# Patient Record
Sex: FEMALE | Race: WHITE | NOT HISPANIC OR LATINO | Employment: OTHER | URBAN - METROPOLITAN AREA
[De-identification: names, ages, dates, MRNs, and addresses within clinical notes are randomized per-mention and may not be internally consistent; named-entity substitution may affect disease eponyms.]

---

## 2018-07-05 ENCOUNTER — HISTORICAL (OUTPATIENT)
Dept: RADIOLOGY | Facility: HOSPITAL | Age: 78
End: 2018-07-05

## 2020-09-02 ENCOUNTER — HISTORICAL (OUTPATIENT)
Dept: RADIOLOGY | Facility: HOSPITAL | Age: 80
End: 2020-09-02

## 2023-07-08 ENCOUNTER — HOSPITAL ENCOUNTER (OUTPATIENT)
Facility: HOSPITAL | Age: 83
Discharge: HOME | End: 2023-07-11
Attending: EMERGENCY MEDICINE | Admitting: INTERNAL MEDICINE
Payer: MEDICARE

## 2023-07-08 DIAGNOSIS — R06.02 SHORTNESS OF BREATH: ICD-10-CM

## 2023-07-08 DIAGNOSIS — I50.21 ACUTE SYSTOLIC CHF (CONGESTIVE HEART FAILURE): Primary | ICD-10-CM

## 2023-07-08 DIAGNOSIS — I16.0 HYPERTENSIVE URGENCY: ICD-10-CM

## 2023-07-08 DIAGNOSIS — R06.02 SOB (SHORTNESS OF BREATH): ICD-10-CM

## 2023-07-08 LAB
ALBUMIN SERPL-MCNC: 3.9 G/DL (ref 3.4–4.8)
ALBUMIN/GLOB SERPL: 1.2 RATIO (ref 1.1–2)
ALP SERPL-CCNC: 56 UNIT/L (ref 40–150)
ALT SERPL-CCNC: 11 UNIT/L (ref 0–55)
AST SERPL-CCNC: 12 UNIT/L (ref 5–34)
BASOPHILS # BLD AUTO: 0.06 X10(3)/MCL
BASOPHILS NFR BLD AUTO: 0.5 %
BILIRUBIN DIRECT+TOT PNL SERPL-MCNC: 0.4 MG/DL
BNP BLD-MCNC: 886.8 PG/ML
BUN SERPL-MCNC: 20.4 MG/DL (ref 9.8–20.1)
CALCIUM SERPL-MCNC: 10.2 MG/DL (ref 8.4–10.2)
CHLORIDE SERPL-SCNC: 105 MMOL/L (ref 98–107)
CO2 SERPL-SCNC: 25 MMOL/L (ref 23–31)
CREAT SERPL-MCNC: 1.23 MG/DL (ref 0.55–1.02)
EOSINOPHIL # BLD AUTO: 0.13 X10(3)/MCL (ref 0–0.9)
EOSINOPHIL NFR BLD AUTO: 1.1 %
ERYTHROCYTE [DISTWIDTH] IN BLOOD BY AUTOMATED COUNT: 13.4 % (ref 11.5–17)
GFR SERPLBLD CREATININE-BSD FMLA CKD-EPI: 44 MLS/MIN/1.73/M2
GLOBULIN SER-MCNC: 3.3 GM/DL (ref 2.4–3.5)
GLUCOSE SERPL-MCNC: 145 MG/DL (ref 82–115)
HCT VFR BLD AUTO: 36.6 % (ref 37–47)
HGB BLD-MCNC: 11.6 G/DL (ref 12–16)
IMM GRANULOCYTES # BLD AUTO: 0.06 X10(3)/MCL (ref 0–0.04)
IMM GRANULOCYTES NFR BLD AUTO: 0.5 %
INR BLD: 1.1 (ref 0–1.3)
LYMPHOCYTES # BLD AUTO: 1.49 X10(3)/MCL (ref 0.6–4.6)
LYMPHOCYTES NFR BLD AUTO: 13 %
MCH RBC QN AUTO: 27.6 PG (ref 27–31)
MCHC RBC AUTO-ENTMCNC: 31.7 G/DL (ref 33–36)
MCV RBC AUTO: 87.1 FL (ref 80–94)
MONOCYTES # BLD AUTO: 0.89 X10(3)/MCL (ref 0.1–1.3)
MONOCYTES NFR BLD AUTO: 7.8 %
NEUTROPHILS # BLD AUTO: 8.81 X10(3)/MCL (ref 2.1–9.2)
NEUTROPHILS NFR BLD AUTO: 77.1 %
NRBC BLD AUTO-RTO: 0 %
PLATELET # BLD AUTO: 260 X10(3)/MCL (ref 130–400)
PMV BLD AUTO: 11.4 FL (ref 7.4–10.4)
POTASSIUM SERPL-SCNC: 4.8 MMOL/L (ref 3.5–5.1)
PROT SERPL-MCNC: 7.2 GM/DL (ref 5.8–7.6)
PROTHROMBIN TIME: 14.1 SECONDS (ref 12.5–14.5)
RBC # BLD AUTO: 4.2 X10(6)/MCL (ref 4.2–5.4)
SODIUM SERPL-SCNC: 142 MMOL/L (ref 136–145)
TROPONIN I SERPL-MCNC: 0.02 NG/ML (ref 0–0.04)
TROPONIN I SERPL-MCNC: 0.03 NG/ML (ref 0–0.04)
TROPONIN I SERPL-MCNC: <0.01 NG/ML (ref 0–0.04)
WBC # SPEC AUTO: 11.44 X10(3)/MCL (ref 4.5–11.5)

## 2023-07-08 PROCEDURE — G0378 HOSPITAL OBSERVATION PER HR: HCPCS

## 2023-07-08 PROCEDURE — 96376 TX/PRO/DX INJ SAME DRUG ADON: CPT

## 2023-07-08 PROCEDURE — 84484 ASSAY OF TROPONIN QUANT: CPT | Performed by: STUDENT IN AN ORGANIZED HEALTH CARE EDUCATION/TRAINING PROGRAM

## 2023-07-08 PROCEDURE — 63600175 PHARM REV CODE 636 W HCPCS: Performed by: EMERGENCY MEDICINE

## 2023-07-08 PROCEDURE — 80053 COMPREHEN METABOLIC PANEL: CPT | Performed by: STUDENT IN AN ORGANIZED HEALTH CARE EDUCATION/TRAINING PROGRAM

## 2023-07-08 PROCEDURE — 85610 PROTHROMBIN TIME: CPT | Performed by: STUDENT IN AN ORGANIZED HEALTH CARE EDUCATION/TRAINING PROGRAM

## 2023-07-08 PROCEDURE — 93010 ELECTROCARDIOGRAM REPORT: CPT | Mod: ,,, | Performed by: INTERNAL MEDICINE

## 2023-07-08 PROCEDURE — 25000003 PHARM REV CODE 250: Performed by: EMERGENCY MEDICINE

## 2023-07-08 PROCEDURE — 96374 THER/PROPH/DIAG INJ IV PUSH: CPT

## 2023-07-08 PROCEDURE — 93005 ELECTROCARDIOGRAM TRACING: CPT

## 2023-07-08 PROCEDURE — 99285 EMERGENCY DEPT VISIT HI MDM: CPT | Mod: 25

## 2023-07-08 PROCEDURE — 63600175 PHARM REV CODE 636 W HCPCS

## 2023-07-08 PROCEDURE — 25000003 PHARM REV CODE 250

## 2023-07-08 PROCEDURE — 93010 EKG 12-LEAD: ICD-10-PCS | Mod: ,,, | Performed by: INTERNAL MEDICINE

## 2023-07-08 PROCEDURE — 85025 COMPLETE CBC W/AUTO DIFF WBC: CPT | Performed by: STUDENT IN AN ORGANIZED HEALTH CARE EDUCATION/TRAINING PROGRAM

## 2023-07-08 PROCEDURE — 83880 ASSAY OF NATRIURETIC PEPTIDE: CPT | Performed by: STUDENT IN AN ORGANIZED HEALTH CARE EDUCATION/TRAINING PROGRAM

## 2023-07-08 PROCEDURE — 84484 ASSAY OF TROPONIN QUANT: CPT | Mod: 91 | Performed by: EMERGENCY MEDICINE

## 2023-07-08 RX ORDER — HYDRALAZINE HYDROCHLORIDE 20 MG/ML
10 INJECTION INTRAMUSCULAR; INTRAVENOUS
Status: DISCONTINUED | OUTPATIENT
Start: 2023-07-08 | End: 2023-07-08

## 2023-07-08 RX ORDER — POLYETHYLENE GLYCOL 3350 17 G/17G
17 POWDER, FOR SOLUTION ORAL DAILY
Status: DISCONTINUED | OUTPATIENT
Start: 2023-07-08 | End: 2023-07-11 | Stop reason: HOSPADM

## 2023-07-08 RX ORDER — FAMOTIDINE 20 MG/1
20 TABLET, FILM COATED ORAL DAILY
Status: DISCONTINUED | OUTPATIENT
Start: 2023-07-08 | End: 2023-07-11 | Stop reason: HOSPADM

## 2023-07-08 RX ORDER — SODIUM CHLORIDE 0.9 % (FLUSH) 0.9 %
10 SYRINGE (ML) INJECTION
Status: DISCONTINUED | OUTPATIENT
Start: 2023-07-08 | End: 2023-07-11 | Stop reason: HOSPADM

## 2023-07-08 RX ORDER — METFORMIN HYDROCHLORIDE 500 MG/1
500 TABLET ORAL 2 TIMES DAILY WITH MEALS
COMMUNITY

## 2023-07-08 RX ORDER — LOSARTAN POTASSIUM 50 MG/1
100 TABLET ORAL DAILY
Status: DISCONTINUED | OUTPATIENT
Start: 2023-07-08 | End: 2023-07-10

## 2023-07-08 RX ORDER — FUROSEMIDE 10 MG/ML
40 INJECTION INTRAMUSCULAR; INTRAVENOUS
Status: COMPLETED | OUTPATIENT
Start: 2023-07-08 | End: 2023-07-08

## 2023-07-08 RX ORDER — HYDROCODONE BITARTRATE AND ACETAMINOPHEN 5; 325 MG/1; MG/1
1 TABLET ORAL EVERY 4 HOURS PRN
Status: DISCONTINUED | OUTPATIENT
Start: 2023-07-08 | End: 2023-07-11 | Stop reason: HOSPADM

## 2023-07-08 RX ORDER — ATORVASTATIN CALCIUM 10 MG/1
20 TABLET, FILM COATED ORAL NIGHTLY
Status: DISCONTINUED | OUTPATIENT
Start: 2023-07-08 | End: 2023-07-11 | Stop reason: HOSPADM

## 2023-07-08 RX ORDER — ACETAMINOPHEN 325 MG/1
650 TABLET ORAL EVERY 8 HOURS PRN
Status: DISCONTINUED | OUTPATIENT
Start: 2023-07-08 | End: 2023-07-11 | Stop reason: HOSPADM

## 2023-07-08 RX ORDER — SOTALOL HYDROCHLORIDE 80 MG/1
80 TABLET ORAL 2 TIMES DAILY
Status: DISCONTINUED | OUTPATIENT
Start: 2023-07-08 | End: 2023-07-11 | Stop reason: HOSPADM

## 2023-07-08 RX ORDER — ASPIRIN 325 MG
325 TABLET, DELAYED RELEASE (ENTERIC COATED) ORAL
Status: COMPLETED | OUTPATIENT
Start: 2023-07-08 | End: 2023-07-08

## 2023-07-08 RX ORDER — GLIPIZIDE 2.5 MG/1
5 TABLET, EXTENDED RELEASE ORAL
COMMUNITY

## 2023-07-08 RX ORDER — FUROSEMIDE 10 MG/ML
20 INJECTION INTRAMUSCULAR; INTRAVENOUS ONCE
Status: COMPLETED | OUTPATIENT
Start: 2023-07-08 | End: 2023-07-08

## 2023-07-08 RX ORDER — ATORVASTATIN CALCIUM 20 MG/1
20 TABLET, FILM COATED ORAL NIGHTLY
COMMUNITY

## 2023-07-08 RX ORDER — ONDANSETRON 2 MG/ML
4 INJECTION INTRAMUSCULAR; INTRAVENOUS EVERY 8 HOURS PRN
Status: DISCONTINUED | OUTPATIENT
Start: 2023-07-08 | End: 2023-07-11 | Stop reason: HOSPADM

## 2023-07-08 RX ORDER — TALC
6 POWDER (GRAM) TOPICAL NIGHTLY PRN
Status: DISCONTINUED | OUTPATIENT
Start: 2023-07-08 | End: 2023-07-11 | Stop reason: HOSPADM

## 2023-07-08 RX ORDER — LOSARTAN POTASSIUM 100 MG/1
100 TABLET ORAL DAILY
Status: ON HOLD | COMMUNITY
End: 2023-07-11 | Stop reason: HOSPADM

## 2023-07-08 RX ADMIN — Medication 6 MG: at 08:07

## 2023-07-08 RX ADMIN — ATORVASTATIN CALCIUM 20 MG: 10 TABLET, FILM COATED ORAL at 08:07

## 2023-07-08 RX ADMIN — FUROSEMIDE 40 MG: 10 INJECTION, SOLUTION INTRAMUSCULAR; INTRAVENOUS at 05:07

## 2023-07-08 RX ADMIN — NITROGLYCERIN 1 INCH: 20 OINTMENT TOPICAL at 03:07

## 2023-07-08 RX ADMIN — SOTALOL HYDROCHLORIDE 80 MG: 80 TABLET ORAL at 08:07

## 2023-07-08 RX ADMIN — ASPIRIN 325 MG: 325 TABLET, COATED ORAL at 03:07

## 2023-07-08 RX ADMIN — FAMOTIDINE 20 MG: 20 TABLET, FILM COATED ORAL at 09:07

## 2023-07-08 RX ADMIN — APIXABAN 5 MG: 5 TABLET, FILM COATED ORAL at 08:07

## 2023-07-08 RX ADMIN — POLYETHYLENE GLYCOL 3350 17 G: 17 POWDER, FOR SOLUTION ORAL at 09:07

## 2023-07-08 RX ADMIN — FUROSEMIDE 20 MG: 10 INJECTION, SOLUTION INTRAMUSCULAR; INTRAVENOUS at 08:07

## 2023-07-08 NOTE — Clinical Note
Diagnosis: Shortness of breath [786.05.ICD-9-CM]   Future Attending Provider: KEN PADILLA [[id number]]   Admitting Provider:: KEN PADILLA [[id number]]

## 2023-07-08 NOTE — H&P
[facility] - Emergency Dept    Cardiology  History and Physical     Patient Name: Niharika Campbell  MRN: [id number]  Admission Date: 7/8/2023  Code Status: Full Code   Attending Provider: Ritchie Dowd MD   Primary Care Physician: No primary care provider on file.  Principal Problem:<principal problem not specified>    Patient information was obtained from patient and ER records.     Subjective:     Chief Complaint: CHF    HPI: This is a 83 year old female known to [facility], Dr. Toro, with a PMH of PAF (on Eliquis), Bio MVR (2012), HTN, HLD, and DM.  She presented to [facility] ED with complaints of SOB for two consecutive nights.  Patient denied any exertional dyspnea with ambulation, peripheral edema, chest pain or palpitations. Chest x- ray - revealed cardiomegaly.  BP on admission 190/91.  BNP- 886.8, tropnin- negative,  Patient was noted to have a recent in office ECHO on 5.30.23 that revealed a mildly reduced systolic function with EF of 45% and grade II diastolic dysfunction. Mild to moderate regurgitation of prosthetic noted (see report below). The patient is admitted for CHF     PMH: PAF (on Eliquis), Bio MVR (2012), HTN, HLD, and DM  PSH: Bio MVR (2012), LHC, colon surgery, hysterectomy, cholecystectomy  Family History: Mother- DM   Social History: Never smoked     Previous Cardiac Diagnostics:   ECHO 5.30.23  1. The study quality is average.   2. The left ventricle is normal in size. Global left ventricular systolic function is mildly decreased. The left ventricular ejection fraction is 45%. GLS is -8.5%.  The left ventricle diastolic function is impaired (Grade II) with an elevated left atrial pressure.Moderate to severe asymmetric septal left ventricular hypertrophy is present.   3. Right ventricular systolic function is borderline normal. TAPSE ~ 1.3 cm. RVTD ~ 9 cm/s.  4. The left atrial diameter is mildly increased ~4.1 cm; DRE ~70.3ml/m².  5. The bio prosthetic mitral valve is a porcine  valve. Regurgitation of the prosthetic valve is noted. The area by pressure half time is 1.5 cm². The mean trans mitral gradient is 4.0 mmHg. Mild to moderate (1-2+) regurgitation is noted.  6. Mild to moderate aortic valve stenosis is present. The aortic valve area planimetry is 0.6 cm². Aortic valve area continuity equation is 0.9 cm². PSV ~2.5 m/s. MG ~13.2 mmHg. DI ~0.30. Moderate calcification of the aortic valve is noted.  7. Mild to moderate (1-2+) aortic and pulmonic, mild (1+) tricuspid regurgitation.   8. The pulmonary artery systolic pressure is 13 mmHg.     ECHO 9.20.20  1. The study quality is average.   2. The left ventricle is normal in size. Global left ventricular systolic function is normal. The left ventricular ejection fraction is 60%. Left ventricular diastolic function is indeterminate. Noted left ventricular hypertrophy. Concentric left ventricular hypertrophy is present. It is mild to moderate.  3. The left atrial diameter is mildly increased.  4. The right atrium is mildly enlarged.   5. Aortic cusp separation measures 1.3 cms . Noted evidence of aortic stenosis. Mild aortic valve stenosis is present. Aortic valve area continuity equation is 1.5 cm². Noted evidence of aortic regurgitation. Mild to moderate (1-2+) aortic regurgitation. The trans-aortic peak velocity is 2.4 m/s. The trans-aortic mean gradient is 9.7 mmHg.  6. A bio prosthetic valve is noted at the mitral location. The bio prosthetic valve is a porcine valve. Regurgitation of the prosthetic valve is noted. MV MPG 6.3 mmHg. Mitral regurgitation is noted. Mild to moderate (1-2+) mitral regurgitation.  7. Evidence of pulmonic regurgitation is noted. Mild (1+) pulmonic regurgitation.  8. Evidence of tricuspid regurgitation is present. Mild (1+) tricuspid regurgitation.  9. The pulmonary artery systolic pressure is 37 mmHg. The pulmonary artery appears to be normal.       [facility] 4.18.12   Normal coronary arteries    History reviewed.  No pertinent past medical history.    History reviewed. No pertinent surgical history.    Review of patient's allergies indicates:  No Known Allergies    No current facility-administered medications on file prior to encounter.     Current Outpatient Medications on File Prior to Encounter   Medication Sig    apixaban (ELIQUIS) 5 mg Tab Take 5 mg by mouth 2 (two) times daily.    atorvastatin (LIPITOR) 20 MG tablet Take 20 mg by mouth nightly.    glipiZIDE (GLUCOTROL) 2.5 MG TR24 Take 5 mg by mouth before breakfast.    losartan (COZAAR) 100 MG tablet Take 100 mg by mouth once daily.    metFORMIN (GLUCOPHAGE) 500 MG tablet Take 500 mg by mouth 2 (two) times daily with meals.    sotalol HCl (SOTALOL ORAL) Take 80 mg by mouth 2 (two) times a day.     Family History    None       Tobacco Use    Smoking status: Not on file    Smokeless tobacco: Not on file   Substance and Sexual Activity    Alcohol use: Not on file    Drug use: Not on file    Sexual activity: Not on file     Review of Systems   Respiratory:  Positive for shortness of breath.    Objective:     Vital Signs (Most Recent):  Temp: 98.2 °F (36.8 °C) (07/08/23 0333)  Pulse: 60 (07/08/23 0703)  Resp: (!) 23 (07/08/23 0703)  BP: (!) 165/73 (07/08/23 0703)  SpO2: (!) 94 % (07/08/23 0703) Vital Signs (24h Range):  Temp:  [98.2 °F (36.8 °C)] 98.2 °F (36.8 °C)  Pulse:  [60-94] 60  Resp:  [19-26] 23  SpO2:  [91 %-97 %] 94 %  BP: (148-198)/(73-99) 165/73     Weight: 77.1 kg (170 lb)  Body mass index is 32.12 kg/m².    SpO2: (!) 94 %         Intake/Output Summary (Last 24 hours) at 7/8/2023 0920  Last data filed at 7/8/2023 0658  Gross per 24 hour   Intake --   Output 800 ml   Net -800 ml       Lines/Drains/Airways       Peripheral Intravenous Line  Duration                  Peripheral IV - Single Lumen 07/08/23 0420 20 G Left Forearm <1 day                    Physical Exam  Cardiovascular:      Rate and Rhythm: Normal rate. Rhythm irregular.   Musculoskeletal:          General: Normal range of motion.   Neurological:      General: No focal deficit present.      Mental Status: She is alert.       EKG:     Telemetry:     Significant Labs: CMP   Recent Labs   Lab 07/08/23  0353      K 4.8   CO2 25   BUN 20.4*   CREATININE 1.23*   CALCIUM 10.2   ALBUMIN 3.9   BILITOT 0.4   ALKPHOS 56   AST 12   ALT 11   , CBC   Recent Labs   Lab 07/08/23 0353   WBC 11.44   HGB 11.6*   HCT 36.6*      , INR   Recent Labs   Lab 07/08/23 0353   INR 1.10   PROTIME 14.1   , and   Recent Lab Results         07/08/23 0353        Albumin/Globulin Ratio 1.2       Albumin 3.9       Alkaline Phosphatase 56       ALT 11       AST 12       Baso # 0.06       Basophil % 0.5       BILIRUBIN TOTAL 0.4       .8       BUN 20.4       Calcium 10.2       Chloride 105       CO2 25       Creatinine 1.23       eGFR 44       Eos # 0.13       Eosinophil % 1.1       Globulin, Total 3.3       Glucose 145       Hematocrit 36.6       Hemoglobin 11.6       Immature Grans (Abs) 0.06       Immature Granulocytes 0.5       INR 1.10       Lymph # 1.49       LYMPH % 13.0       MCH 27.6       MCHC 31.7       MCV 87.1       Mono # 0.89       Mono % 7.8       MPV 11.4       Neut # 8.81       Neut % 77.1       nRBC 0.0       Platelets 260       Potassium 4.8       PROTEIN TOTAL 7.2       Protime 14.1       RBC 4.20       RDW 13.4       Sodium 142       Troponin I <0.010       WBC 11.44               Significant Imaging:   Assessment and Plan:   Acute HFrEF  - LVEF -45% per ECHO 5.30.23 decreased from previous EF-60% per ECHO 9.20.20  - PAF (on Eliquis),   Bio MVR (2012)  HTN  HLD  DM      Plan   Continue Home losartan. HCTZ on hold  Resume home sotalol 80 mg BID, statin  Resume home Eliquis for primary prevention of CVA  Strict Intake and Output/Daily Weights      VTE Risk Mitigation (From admission, onward)           Ordered     IP VTE HIGH RISK PATIENT  Once         07/08/23 0506     Place sequential compression device   Until discontinued         07/08/23 0506                    MARYJANE Payton  Cardiology  [facility] - Emergency Dept  07/08/2023 9:20 AM

## 2023-07-08 NOTE — ED PROVIDER NOTES
"Encounter Date: 7/8/2023    SCRIBE #1 NOTE: I, Dedejames Ackerman, am scribing for, and in the presence of,  Darlin Martin MD. I have scribed the following portions of the note - Other sections scribed: HPI, ROS, PE.     History     Chief Complaint   Patient presents with    Shortness of Breath     Pt reports SOB starting @ 2300 last night. Pt reports this is the second night the SOB has occurred. Pt claims it is because she has nasal congestion and is "stopped up". Hx of valve replacement and a-fib, followed by . O2 sat 92% on RA, respirations even and labored.      83 year old female with pmhx of Afib, DM, CHF, HTN, and HLD presents to the ED c/o SOB onset last night. She reports waking up with SOB for two consecutive nights accompanied by a congested nose, which she notes does not occur during the day. Pt reports that sitting up from a flat position did not improve SOB. She states that she does not experience SOB when ambulating and is not currently experiencing SOB. She reports a slight cough and denies fever, chest pain, leg swelling, weight gain, abdominal pain, or palpitations. She denies past history of smoking and is not currently taking aspirin. She is currently on the blood thinner Eliquis. She states she has had a valve replacement eleven years ago and has not had valve issues in the past, reports normal blood pressure at home. She states that she has had past angiogram and stress test. She states that she is seeing Dr. Brannon Toro this Monday for a follow up regarding EKG and valve ultrasound results.    Pt's PCP is Dr. Damion Tam.  Pt's cardiologist is Dr. Brannon Toro.    The history is provided by the patient. No  was used.   Review of patient's allergies indicates:  No Known Allergies  History reviewed. No pertinent past medical history.  History reviewed. No pertinent surgical history.  History reviewed. No pertinent family history.     Review of Systems   Constitutional:  " Negative for chills, diaphoresis and fever.   HENT:  Positive for rhinorrhea. Negative for congestion, ear pain, sinus pain and sore throat.    Eyes:  Negative for pain, discharge and visual disturbance.   Respiratory:  Positive for cough and shortness of breath. Negative for wheezing and stridor.    Cardiovascular:  Negative for chest pain, palpitations and leg swelling.   Gastrointestinal:  Negative for abdominal pain, constipation, diarrhea, nausea, rectal pain and vomiting.   Genitourinary:  Negative for dysuria and hematuria.   Musculoskeletal:  Negative for back pain and myalgias.   Skin:  Negative for rash.   Neurological:  Negative for dizziness, syncope, numbness and headaches.   Hematological: Negative.    Psychiatric/Behavioral: Negative.     All other systems reviewed and are negative.    Physical Exam     Initial Vitals [07/08/23 0333]   BP Pulse Resp Temp SpO2   (!) 190/91 77 (!) 22 98.2 °F (36.8 °C) (!) 92 %      MAP       --         Physical Exam    Nursing note and vitals reviewed.  Constitutional: She appears well-developed and well-nourished. She is not diaphoretic. No distress.   HENT:   Head: Normocephalic and atraumatic.   Nose: Nose normal.   Mouth/Throat: Oropharynx is clear and moist.   Eyes: Conjunctivae and EOM are normal. Pupils are equal, round, and reactive to light.   Neck: Trachea normal. Neck supple.   Normal range of motion.  Cardiovascular:  Normal heart sounds and intact distal pulses.           No murmur heard.  Pulses:       Radial pulses are 2+ on the right side and 2+ on the left side.        Dorsalis pedis pulses are 2+ on the right side and 2+ on the left side.   Irregular rate   Pulmonary/Chest: No respiratory distress. She has no wheezes. She has no rhonchi. She has rales. She exhibits no tenderness.   bilateral crackles   Abdominal: Abdomen is soft. Bowel sounds are normal. She exhibits no distension and no mass. no abdominal tenderness There is no rebound and no  guarding.   Musculoskeletal:         General: Edema present. No tenderness. Normal range of motion.      Cervical back: Normal range of motion and neck supple.      Lumbar back: Normal. Normal range of motion.      Comments: Trace pre pedal edema     Neurological: She is alert and oriented to person, place, and time. She has normal strength. No cranial nerve deficit or sensory deficit.   Skin: Skin is warm and dry. Capillary refill takes less than 2 seconds. No abscess noted. No erythema. No pallor.   Psychiatric: She has a normal mood and affect. Her behavior is normal. Judgment and thought content normal.       ED Course   Procedures  Labs Reviewed   COMPREHENSIVE METABOLIC PANEL - Abnormal; Notable for the following components:       Result Value    Glucose Level 145 (*)     Blood Urea Nitrogen 20.4 (*)     Creatinine 1.23 (*)     All other components within normal limits   B-TYPE NATRIURETIC PEPTIDE - Abnormal; Notable for the following components:    Natriuretic Peptide 886.8 (*)     All other components within normal limits   CBC WITH DIFFERENTIAL - Abnormal; Notable for the following components:    Hgb 11.6 (*)     Hct 36.6 (*)     MCHC 31.7 (*)     MPV 11.4 (*)     IG# 0.06 (*)     All other components within normal limits   TROPONIN I - Normal   PROTIME-INR - Normal   CBC W/ AUTO DIFFERENTIAL    Narrative:     The following orders were created for panel order CBC auto differential.  Procedure                               Abnormality         Status                     ---------                               -----------         ------                     CBC with Differential[[id number]]        Abnormal            Final result                 Please view results for these tests on the individual orders.          Imaging Results              X-Ray Chest AP Portable (Preliminary result)  Result time 07/08/23 03:50:59      Wet Read by Darlin Martin MD (07/08/23 03:50:59, [facility]  Emergency [facility], Emergency Medicine)    cardiomegaly                                     Medications   sodium chloride 0.9% flush 10 mL (has no administration in time range)   melatonin tablet 6 mg (has no administration in time range)   acetaminophen tablet 650 mg (has no administration in time range)   HYDROcodone-acetaminophen 5-325 mg per tablet 1 tablet (has no administration in time range)   polyethylene glycol packet 17 g (has no administration in time range)   famotidine tablet 20 mg (has no administration in time range)   ondansetron injection 4 mg (has no administration in time range)   losartan tablet 100 mg (has no administration in time range)   aspirin EC tablet 325 mg (325 mg Oral Given 7/8/23 0356)   nitroGLYCERIN 2% TD oint ointment 1 inch (1 inch Topical (Top) Given 7/8/23 0356)   furosemide injection 40 mg (40 mg Intravenous Given 7/8/23 0502)     Medical Decision Making:   History:   Old Medical Records: I decided to obtain old medical records.  Old Records Summarized: records from previous admission(s).       <> Summary of Records: Previous visit for palpitations  Initial Assessment:   See hpi  Independently Interpreted Test(s):   I have ordered and independently interpreted X-rays - see prior notes.  I have ordered and independently interpreted EKG Reading(s) - see prior notes  Clinical Tests:   Lab Tests: Ordered and Reviewed  The following lab test(s) were unremarkable: CBC, CMP and Troponin  Radiological Study: Ordered and Reviewed  Medical Tests: Reviewed and Ordered  Other:   I have discussed this case with another health care provider.        Scribe Attestation:   Scribe #1: I performed the above scribed service and the documentation accurately describes the services I performed. I attest to the accuracy of the note.  Comments: Attending:   Physician Attestation Statement for Scribe #1: I, Darlin Martin MD, personally performed the services described in this documentation. All medical  record entries made by the scribe were at my direction and in my presence.  I have reviewed the chart and agree that the record reflects my personal performance and is accurate and complete.        Attending Attestation:           Physician Attestation for Scribe:  Physician Attestation Statement for Scribe #1: I, Darlin Martin MD, reviewed documentation, as scribed by Dede Ackerman in my presence, and it is both accurate and complete.       Medical Decision Making  Differential diagnosis includes but is not limited to CHF, ACS, renal failure, and anemia.  CBC, CMP, troponin, BNP, EKG and chest x-ray ordered and reviewed  CBC, CMP and troponin are within normal limits BNP elevated at 800 and EKG nonspecific.  Chest x-ray with cardiomegaly and pulmonary vascular congestion  Signs and symptoms are most consistent with PND episodes due to new onset CHF.  Recent echocardiogram showed a depressed EF of 45%.  He was given aspirin and nitroglycerin with some improvement in her blood pressure and Lasix administered.  Discussed with Cardiology who agree with admission and patient was comfortable with this plan    Cardiac monitoring ordered and reviewed rate of 68 sinus rhythm    Amount and/or Complexity of Data Reviewed  Independent Historian: caregiver  External Data Reviewed: notes.  Labs: ordered. Decision-making details documented in ED Course.  Radiology: ordered and independent interpretation performed. Decision-making details documented in ED Course.  ECG/medicine tests: ordered and independent interpretation performed. Decision-making details documented in ED Course.    Risk  OTC drugs.  Prescription drug management.  Decision regarding hospitalization.           ED Course as of 07/08/23 0507   Sat Jul 08, 2023   0350 Ekg performed at 0341 rate 71 sinus rhythm with first degree av block, lvh with qrs widening, poor r wave progrssion [BS]   0448 Paged [facility]. [BG]   0501 /92 prior to hydralazine. Saqib valles with  [facility] reviewed recent echo, ef 45 which is new. Agrees with admit, bp control, diuresis, patient amenable to this as well [BS]      ED Course User Index  [BG] Alec Ramos  [BS] Darlin Martin MD                 Clinical Impression:   Final diagnoses:  [I50.21] Acute systolic CHF (congestive heart failure) (Primary)  [I16.0] Hypertensive urgency  [R06.02] SOB (shortness of breath)        ED Disposition Condition    Observation Stable                Darlin Martin MD  07/08/23 0406

## 2023-07-08 NOTE — NURSING
Nurses Note -- 4 Eyes      7/8/2023   2:28 PM      Skin assessed during: Admit      [x] No Altered Skin Integrity Present    []Prevention Measures Documented      [] Yes- Altered Skin Integrity Present or Discovered   [] LDA Added if Not in Epic (Describe Wound)   [] New Altered Skin Integrity was Present on Admit and Documented in LDA   [] Wound Image Taken    Wound Care Consulted? No    Attending Nurse:  Mikie Cobb RN     Second RN/Staff Member:  Vilma ABDUL RN

## 2023-07-09 LAB
ALBUMIN SERPL-MCNC: 3.6 G/DL (ref 3.4–4.8)
ALBUMIN/GLOB SERPL: 1 RATIO (ref 1.1–2)
ALP SERPL-CCNC: 50 UNIT/L (ref 40–150)
ALT SERPL-CCNC: 8 UNIT/L (ref 0–55)
AST SERPL-CCNC: 13 UNIT/L (ref 5–34)
BILIRUBIN DIRECT+TOT PNL SERPL-MCNC: 0.5 MG/DL
BUN SERPL-MCNC: 25.9 MG/DL (ref 9.8–20.1)
CALCIUM SERPL-MCNC: 9.9 MG/DL (ref 8.4–10.2)
CHLORIDE SERPL-SCNC: 99 MMOL/L (ref 98–107)
CO2 SERPL-SCNC: 27 MMOL/L (ref 23–31)
CREAT SERPL-MCNC: 1.28 MG/DL (ref 0.55–1.02)
GFR SERPLBLD CREATININE-BSD FMLA CKD-EPI: 42 MLS/MIN/1.73/M2
GLOBULIN SER-MCNC: 3.7 GM/DL (ref 2.4–3.5)
GLUCOSE SERPL-MCNC: 143 MG/DL (ref 82–115)
MAGNESIUM SERPL-MCNC: 1.6 MG/DL (ref 1.6–2.6)
POCT GLUCOSE: 102 MG/DL (ref 70–110)
POTASSIUM SERPL-SCNC: 3.9 MMOL/L (ref 3.5–5.1)
PROT SERPL-MCNC: 7.3 GM/DL (ref 5.8–7.6)
SODIUM SERPL-SCNC: 138 MMOL/L (ref 136–145)

## 2023-07-09 PROCEDURE — 25000003 PHARM REV CODE 250

## 2023-07-09 PROCEDURE — 83735 ASSAY OF MAGNESIUM: CPT | Performed by: NURSE PRACTITIONER

## 2023-07-09 PROCEDURE — 80053 COMPREHEN METABOLIC PANEL: CPT | Performed by: NURSE PRACTITIONER

## 2023-07-09 PROCEDURE — 96376 TX/PRO/DX INJ SAME DRUG ADON: CPT

## 2023-07-09 PROCEDURE — G0378 HOSPITAL OBSERVATION PER HR: HCPCS

## 2023-07-09 PROCEDURE — 63600175 PHARM REV CODE 636 W HCPCS: Performed by: NURSE PRACTITIONER

## 2023-07-09 PROCEDURE — 25000003 PHARM REV CODE 250: Performed by: EMERGENCY MEDICINE

## 2023-07-09 RX ORDER — FUROSEMIDE 10 MG/ML
20 INJECTION INTRAMUSCULAR; INTRAVENOUS DAILY
Status: DISCONTINUED | OUTPATIENT
Start: 2023-07-09 | End: 2023-07-10

## 2023-07-09 RX ADMIN — POLYETHYLENE GLYCOL 3350 17 G: 17 POWDER, FOR SOLUTION ORAL at 08:07

## 2023-07-09 RX ADMIN — APIXABAN 5 MG: 5 TABLET, FILM COATED ORAL at 08:07

## 2023-07-09 RX ADMIN — ATORVASTATIN CALCIUM 20 MG: 10 TABLET, FILM COATED ORAL at 08:07

## 2023-07-09 RX ADMIN — FUROSEMIDE 20 MG: 10 INJECTION, SOLUTION INTRAMUSCULAR; INTRAVENOUS at 12:07

## 2023-07-09 RX ADMIN — SOTALOL HYDROCHLORIDE 80 MG: 80 TABLET ORAL at 08:07

## 2023-07-09 RX ADMIN — LOSARTAN POTASSIUM 100 MG: 50 TABLET, FILM COATED ORAL at 08:07

## 2023-07-09 RX ADMIN — Medication 6 MG: at 08:07

## 2023-07-09 RX ADMIN — FAMOTIDINE 20 MG: 20 TABLET, FILM COATED ORAL at 08:07

## 2023-07-09 NOTE — PROGRESS NOTES
[facility] - Emergency Dept    Cardiology  History and Physical     Patient Name: Niharika Campbell  MRN: [id number]  Admission Date: 7/8/2023  Code Status: Full Code   Attending Provider: Ritchie Dowd MD   Primary Care Physician: No primary care provider on file.  Principal Problem:<principal problem not specified>    Patient information was obtained from patient and ER records.     Subjective:     Chief Complaint: CHF    HPI: This is a 83 year old female known to [facility], Dr. Toro, with a PMH of PAF (on Eliquis), Bio MVR (2012), HTN, HLD, and DM.  She presented to [facility] ED with complaints of SOB for two consecutive nights.  Patient denied any exertional dyspnea with ambulation, peripheral edema, chest pain or palpitations. Chest x- ray - revealed cardiomegaly.  BP on admission 190/91.  BNP- 886.8, tropnin- negative,  Patient was noted to have a recent in office ECHO on 5.30.23 that revealed a mildly reduced systolic function with EF of 45% and grade II diastolic dysfunction. Mild to moderate regurgitation of prosthetic noted (see report below). The patient is admitted for CHF     7.9.23: Patient endorses improved SOB. Denies CP.     PMH: PAF (on Eliquis), Bio MVR (2012), HTN, HLD, and DM  PSH: Bio MVR (2012), LHC, colon surgery, hysterectomy, cholecystectomy  Family History: Mother- DM   Social History: Never smoked     Previous Cardiac Diagnostics:   ECHO 5.30.23  1. The study quality is average.   2. The left ventricle is normal in size. Global left ventricular systolic function is mildly decreased. The left ventricular ejection fraction is 45%. GLS is -8.5%.  The left ventricle diastolic function is impaired (Grade II) with an elevated left atrial pressure.Moderate to severe asymmetric septal left ventricular hypertrophy is present.   3. Right ventricular systolic function is borderline normal. TAPSE ~ 1.3 cm. RVTD ~ 9 cm/s.  4. The left atrial diameter is mildly increased ~4.1 cm; DRE  ~70.3ml/m².  5. The bio prosthetic mitral valve is a porcine valve. Regurgitation of the prosthetic valve is noted. The area by pressure half time is 1.5 cm². The mean trans mitral gradient is 4.0 mmHg. Mild to moderate (1-2+) regurgitation is noted.  6. Mild to moderate aortic valve stenosis is present. The aortic valve area planimetry is 0.6 cm². Aortic valve area continuity equation is 0.9 cm². PSV ~2.5 m/s. MG ~13.2 mmHg. DI ~0.30. Moderate calcification of the aortic valve is noted.  7. Mild to moderate (1-2+) aortic and pulmonic, mild (1+) tricuspid regurgitation.   8. The pulmonary artery systolic pressure is 13 mmHg.     ECHO 9.20.20  1. The study quality is average.   2. The left ventricle is normal in size. Global left ventricular systolic function is normal. The left ventricular ejection fraction is 60%. Left ventricular diastolic function is indeterminate. Noted left ventricular hypertrophy. Concentric left ventricular hypertrophy is present. It is mild to moderate.  3. The left atrial diameter is mildly increased.  4. The right atrium is mildly enlarged.   5. Aortic cusp separation measures 1.3 cms . Noted evidence of aortic stenosis. Mild aortic valve stenosis is present. Aortic valve area continuity equation is 1.5 cm². Noted evidence of aortic regurgitation. Mild to moderate (1-2+) aortic regurgitation. The trans-aortic peak velocity is 2.4 m/s. The trans-aortic mean gradient is 9.7 mmHg.  6. A bio prosthetic valve is noted at the mitral location. The bio prosthetic valve is a porcine valve. Regurgitation of the prosthetic valve is noted. MV MPG 6.3 mmHg. Mitral regurgitation is noted. Mild to moderate (1-2+) mitral regurgitation.  7. Evidence of pulmonic regurgitation is noted. Mild (1+) pulmonic regurgitation.  8. Evidence of tricuspid regurgitation is present. Mild (1+) tricuspid regurgitation.  9. The pulmonary artery systolic pressure is 37 mmHg. The pulmonary artery appears to be normal.        [facility] 4.18.12   Normal coronary arteries    History reviewed. No pertinent past medical history.    History reviewed. No pertinent surgical history.    Review of patient's allergies indicates:  No Known Allergies    No current facility-administered medications on file prior to encounter.     Current Outpatient Medications on File Prior to Encounter   Medication Sig    apixaban (ELIQUIS) 5 mg Tab Take 5 mg by mouth 2 (two) times daily.    atorvastatin (LIPITOR) 20 MG tablet Take 20 mg by mouth nightly.    glipiZIDE (GLUCOTROL) 2.5 MG TR24 Take 5 mg by mouth before breakfast.    losartan (COZAAR) 100 MG tablet Take 100 mg by mouth once daily.    metFORMIN (GLUCOPHAGE) 500 MG tablet Take 500 mg by mouth 2 (two) times daily with meals.    sotalol HCl (SOTALOL ORAL) Take 80 mg by mouth 2 (two) times a day.     Family History    None       Tobacco Use    Smoking status: Not on file    Smokeless tobacco: Not on file   Substance and Sexual Activity    Alcohol use: Not on file    Drug use: Not on file    Sexual activity: Not on file     Review of Systems   Respiratory:  Positive for shortness of breath.    Objective:     Vital Signs (Most Recent):  Temp: 98.2 °F (36.8 °C) (07/08/23 2107)  Pulse: 61 (07/09/23 0754)  Resp: 19 (07/09/23 0754)  BP: (!) 155/75 (07/09/23 0819)  SpO2: (!) 92 % (07/09/23 0754) Vital Signs (24h Range):  Temp:  [98.2 °F (36.8 °C)] 98.2 °F (36.8 °C)  Pulse:  [60-68] 61  Resp:  [19-29] 19  SpO2:  [92 %-96 %] 92 %  BP: (152-189)/(72-83) 155/75     Weight: 77.1 kg (170 lb)  Body mass index is 32.12 kg/m².    SpO2: (!) 92 %         Intake/Output Summary (Last 24 hours) at 7/9/2023 0940  Last data filed at 7/8/2023 1114  Gross per 24 hour   Intake --   Output 400 ml   Net -400 ml         Lines/Drains/Airways       Peripheral Intravenous Line  Duration                  Peripheral IV - Single Lumen 07/08/23 0420 20 G Left Forearm 1 day                    Physical Exam  Cardiovascular:      Rate and Rhythm:  Normal rate. Rhythm irregular.   Musculoskeletal:         General: Normal range of motion.   Neurological:      General: No focal deficit present.      Mental Status: She is alert.       EKG:     Telemetry:     Significant Labs: CMP   Recent Labs   Lab 07/08/23  0353      K 4.8   CO2 25   BUN 20.4*   CREATININE 1.23*   CALCIUM 10.2   ALBUMIN 3.9   BILITOT 0.4   ALKPHOS 56   AST 12   ALT 11     , CBC   Recent Labs   Lab 07/08/23 0353   WBC 11.44   HGB 11.6*   HCT 36.6*        , INR   Recent Labs   Lab 07/08/23  0353   INR 1.10   PROTIME 14.1     , and   Recent Lab Results         07/08/23  1529   07/08/23  1010        Troponin I 0.019   0.028               Significant Imaging:   Assessment and Plan:   Acute HFrEF  - LVEF -45% per ECHO 5.30.23 decreased from previous EF-60% per ECHO 9.20.20  - PAF (on Eliquis),   Bio MVR (2012)  HTN  HLD  DM      Plan   Continue diuresis with Lasix 20 mg IVP daily   Continue Home losartan, sotalol 80 mg BID, and statin  Continue  Eliquis for primary prevention of CVA  Strict Intake and Output/Daily Weights  Labs in AM: CMP, CBC and Mag   Possible discharge in the next 24-48 hours      VTE Risk Mitigation (From admission, onward)           Ordered     apixaban tablet 5 mg  2 times daily         07/08/23 1831     IP VTE HIGH RISK PATIENT  Once         07/08/23 0506     Place sequential compression device  Until discontinued         07/08/23 0506                    MARYJANE Payton  Cardiology  [facility] - Emergency Dept  07/09/2023 9:20 AM

## 2023-07-09 NOTE — PLAN OF CARE
07/09/23 1454   Discharge Assessment   Assessment Type Discharge Planning Assessment   Confirmed/corrected address, phone number and insurance Yes   Confirmed Demographics Correct on Facesheet   Source of Information patient   When was your last doctors appointment?   (Patient reports 1 month ago.)   Communicated ROGERS with patient/caregiver Yes   Reason For Admission SOB   People in Home other relative(s)   Do you expect to return to your current living situation? Yes   Do you have help at home or someone to help you manage your care at home? Yes   Who are your caregiver(s) and their phone number(s)? Daughter, Pattie Coleman: [phone]   Prior to hospitilization cognitive status: Unable to Assess   Current cognitive status: Alert/Oriented   Home Accessibility wheelchair accessible   Home Layout Able to live on 1st floor   Equipment Currently Used at Home none   Readmission within 30 days? No   Patient currently being followed by outpatient case management? No   Do you currently have service(s) that help you manage your care at home? No   Do you take prescription medications? Yes   Do you have prescription coverage? Yes   Coverage [employer]   Do you have any problems affording any of your prescribed medications? No   Is the patient taking medications as prescribed? yes   Who is going to help you get home at discharge? Patient reports her granddaughter, Jose or son   How do you get to doctors appointments? car, drives self   Are you on dialysis? No   Do you take coumadin? No  (Eliquis)   Discharge Plan A Home with family   Discharge Plan B Home   DME Needed Upon Discharge  none   Discharge Plan discussed with: Patient   Transition of Care Barriers None   OTHER   Name(s) of People in Home Patient reports her granddaughter, Jose Bain resides with her at this time.       Patient's PCP is Damion Timmons.  No barriers to discharge at this time.

## 2023-07-10 LAB
ALBUMIN SERPL-MCNC: 3.9 G/DL (ref 3.4–4.8)
ALBUMIN/GLOB SERPL: 1.2 RATIO (ref 1.1–2)
ALP SERPL-CCNC: 54 UNIT/L (ref 40–150)
ALT SERPL-CCNC: 10 UNIT/L (ref 0–55)
AST SERPL-CCNC: 14 UNIT/L (ref 5–34)
BASOPHILS # BLD AUTO: 0.04 X10(3)/MCL
BASOPHILS NFR BLD AUTO: 0.6 %
BILIRUBIN DIRECT+TOT PNL SERPL-MCNC: 0.4 MG/DL
BUN SERPL-MCNC: 27.1 MG/DL (ref 9.8–20.1)
CALCIUM SERPL-MCNC: 10 MG/DL (ref 8.4–10.2)
CHLORIDE SERPL-SCNC: 100 MMOL/L (ref 98–107)
CO2 SERPL-SCNC: 27 MMOL/L (ref 23–31)
CREAT SERPL-MCNC: 1.28 MG/DL (ref 0.55–1.02)
EOSINOPHIL # BLD AUTO: 0.1 X10(3)/MCL (ref 0–0.9)
EOSINOPHIL NFR BLD AUTO: 1.4 %
ERYTHROCYTE [DISTWIDTH] IN BLOOD BY AUTOMATED COUNT: 13.2 % (ref 11.5–17)
GFR SERPLBLD CREATININE-BSD FMLA CKD-EPI: 42 MLS/MIN/1.73/M2
GLOBULIN SER-MCNC: 3.3 GM/DL (ref 2.4–3.5)
GLUCOSE SERPL-MCNC: 126 MG/DL (ref 82–115)
HCT VFR BLD AUTO: 37.2 % (ref 37–47)
HGB BLD-MCNC: 11.9 G/DL (ref 12–16)
IMM GRANULOCYTES # BLD AUTO: 0.02 X10(3)/MCL (ref 0–0.04)
IMM GRANULOCYTES NFR BLD AUTO: 0.3 %
LYMPHOCYTES # BLD AUTO: 1.47 X10(3)/MCL (ref 0.6–4.6)
LYMPHOCYTES NFR BLD AUTO: 20.2 %
MAGNESIUM SERPL-MCNC: 1.8 MG/DL (ref 1.6–2.6)
MCH RBC QN AUTO: 27.2 PG (ref 27–31)
MCHC RBC AUTO-ENTMCNC: 32 G/DL (ref 33–36)
MCV RBC AUTO: 85.1 FL (ref 80–94)
MONOCYTES # BLD AUTO: 0.91 X10(3)/MCL (ref 0.1–1.3)
MONOCYTES NFR BLD AUTO: 12.5 %
NEUTROPHILS # BLD AUTO: 4.73 X10(3)/MCL (ref 2.1–9.2)
NEUTROPHILS NFR BLD AUTO: 65 %
NRBC BLD AUTO-RTO: 0 %
PLATELET # BLD AUTO: 246 X10(3)/MCL (ref 130–400)
PMV BLD AUTO: 11.6 FL (ref 7.4–10.4)
POCT GLUCOSE: 113 MG/DL (ref 70–110)
POTASSIUM SERPL-SCNC: 4.2 MMOL/L (ref 3.5–5.1)
PROT SERPL-MCNC: 7.2 GM/DL (ref 5.8–7.6)
RBC # BLD AUTO: 4.37 X10(6)/MCL (ref 4.2–5.4)
SODIUM SERPL-SCNC: 140 MMOL/L (ref 136–145)
WBC # SPEC AUTO: 7.27 X10(3)/MCL (ref 4.5–11.5)

## 2023-07-10 PROCEDURE — 25000003 PHARM REV CODE 250: Performed by: EMERGENCY MEDICINE

## 2023-07-10 PROCEDURE — 83735 ASSAY OF MAGNESIUM: CPT | Performed by: NURSE PRACTITIONER

## 2023-07-10 PROCEDURE — 25000003 PHARM REV CODE 250: Performed by: NURSE PRACTITIONER

## 2023-07-10 PROCEDURE — G0378 HOSPITAL OBSERVATION PER HR: HCPCS

## 2023-07-10 PROCEDURE — 25000003 PHARM REV CODE 250

## 2023-07-10 PROCEDURE — 63600175 PHARM REV CODE 636 W HCPCS: Performed by: NURSE PRACTITIONER

## 2023-07-10 PROCEDURE — 80053 COMPREHEN METABOLIC PANEL: CPT | Performed by: NURSE PRACTITIONER

## 2023-07-10 PROCEDURE — 85025 COMPLETE CBC W/AUTO DIFF WBC: CPT | Performed by: NURSE PRACTITIONER

## 2023-07-10 RX ORDER — FUROSEMIDE 20 MG/1
20 TABLET ORAL DAILY
Status: DISCONTINUED | OUTPATIENT
Start: 2023-07-10 | End: 2023-07-11 | Stop reason: HOSPADM

## 2023-07-10 RX ADMIN — SOTALOL HYDROCHLORIDE 80 MG: 80 TABLET ORAL at 08:07

## 2023-07-10 RX ADMIN — FAMOTIDINE 20 MG: 20 TABLET, FILM COATED ORAL at 08:07

## 2023-07-10 RX ADMIN — SACUBITRIL AND VALSARTAN 1 TABLET: 49; 51 TABLET, FILM COATED ORAL at 08:07

## 2023-07-10 RX ADMIN — APIXABAN 5 MG: 5 TABLET, FILM COATED ORAL at 08:07

## 2023-07-10 RX ADMIN — Medication 6 MG: at 08:07

## 2023-07-10 RX ADMIN — FUROSEMIDE 20 MG: 20 TABLET ORAL at 10:07

## 2023-07-10 RX ADMIN — ATORVASTATIN CALCIUM 20 MG: 10 TABLET, FILM COATED ORAL at 08:07

## 2023-07-10 NOTE — PROGRESS NOTES
"Inpatient Nutrition Evaluation    Admit Date: 7/8/2023   Total duration of encounter: 2 days    Nutrition Recommendation/Prescription     Continue current diet order as tolerated    Add chocolate Boost GC BID. 250 kcals and 14 g protein per serving.     Nutrition Assessment     Chart Review    Reason Seen: continuous nutrition monitoring HF    Malnutrition Screening Tool Results                Diagnosis:  Acute combined systolic and diastolic HF  - LVEF -45% with Grade II LV DD per ECHO 5.30.23 decreased from previous EF-60% per ECHO 9.20.20  PAF  --CHADS VASC score   Bio MVR (2012)  HTN  HLD  T2DM    Relevant Medical History: PAF (on Eliquis), Bio MVR (2012), HTN, HLD, T2DM, COVID    Nutrition-Related Medications: atorvastatin, lasix, miralax, famotidine     Nutrition-Related Labs: BUN 27.1, Cr 1.28, Glu 126       Diet Order: Diet diabetic Cardiac  Oral Supplement Order: none  Appetite/Oral Intake: good/50-75% of meals  Factors Affecting Nutritional Intake:  dislikes our food   Food/Quaker/Cultural Preferences: none reported  Food Allergies: none reported       Wound(s):   n/a    Comments    7/10/23: Pt reports good appetite but usually does not eat 100% 2/2 not liking our food, no unintentional weight loss - has been intentionally losing weight to improve blood sugar and blood pressure, no GI complaints, bowels moving normally, discussed home diet - under-eats protein - consumes around 20-25 g protein per day, discussed good protein sources, pt well-educated previously on low sodium diet.      Anthropometrics    Height: 5' 1" (154.9 cm) Height Method: Stated  Last Weight: 72.4 kg (159 lb 9.8 oz) (07/10/23 0557) Weight Method: Standard Scale  BMI (Calculated): 30.2  BMI Classification: obese grade I (BMI 30-34.9)     Ideal Body Weight (IBW), Female: 105 lb     % Ideal Body Weight, Female (lb): 161.9 %                             Usual Weight Provided By: patient    Wt Readings from Last 5 Encounters:   07/10/23 " 72.4 kg (159 lb 9.8 oz)     Weight Change(s) Since Admission:  Admit Weight: 77.1 kg (170 lb) (07/08/23 0333)  7/10/23: 163 lbs standing scale RD     Patient Education    Education Provided:  higher protein  Teaching Method: explanation and printed materials  Comprehension: verbalizes understanding  Barriers to Learning: none evident  Expected Compliance: good  Comments: All questions were answered and dietitian's contact information was provided.     Monitoring & Evaluation     Dietitian will monitor food and beverage intake, weight, electrolyte/renal panel, and glucose/endocrine profile.  Nutrition Risk/Follow-Up: low (follow-up in 5-7 days)  Patients assigned 'low nutrition risk' status do not qualify for a full nutritional assessment but will be monitored and re-evaluated in a 5-7 day time period. Please consult if re-evaluation needed sooner.

## 2023-07-10 NOTE — PLAN OF CARE
Reviewed AGUIAR letter over the phone with patient's daughter, Ms. Pattie Coleman, per pt's request. Ms. Car is at the bedside. Emailed copy of AGUIAR letter to ROZINA Braxton to deliver to patient's room.

## 2023-07-10 NOTE — PROGRESS NOTES
[facility] -     Cardiology  Progress Note    Patient Name: Niharika Campbell  MRN: [id number]  Admission Date: 7/8/2023  Code Status: Full Code   Attending Provider: Ritchie Dowd MD   Primary Care Physician: Primary Doctor No  Principal Problem:<principal problem not specified>    Patient information was obtained from patient and ER records.     Subjective:     Chief Complaint: CHF    HPI: This is a 83 year old female known to [facility], Dr. Toro, with a PMH of PAF (on Eliquis), Bio MVR (2012), HTN, HLD, and DM.  She presented to [facility] ED with complaints of SOB for two consecutive nights.  Patient denied any exertional dyspnea with ambulation, peripheral edema, chest pain or palpitations. Chest x- ray - revealed cardiomegaly.  BP on admission 190/91.  BNP- 886.8, tropnin- negative,  Patient was noted to have a recent in office ECHO on 5.30.23 that revealed a mildly reduced systolic function with EF of 45% and grade II diastolic dysfunction. Mild to moderate regurgitation of prosthetic noted (see report below). The patient is admitted for CHF     7.9.23: Patient endorses improved SOB. Denies CP.   7.10.23: Patient sitting up in bedside chair. Report breathing is back to baseline. BP remains above goal.       PMH: PAF (on Eliquis), Bio MVR (2012), HTN, HLD, T2DM, COVID  PSH: Bio MVR (2012), MAZE, LHC, colon surgery, hysterectomy, cholecystectomy  Family History: Mother- DM   Social History: Never smoked     Previous Cardiac Diagnostics:   ECHO 5.30.23  The left ventricle is normal in size. Global left ventricular systolic function is mildly decreased. The left ventricular ejection fraction is 45%. GLS is -8.5%.  The left ventricle diastolic function is impaired (Grade II) with an elevated left atrial pressure.Moderate to severe asymmetric septal left ventricular hypertrophy is present.   Right ventricular systolic function is borderline normal. TAPSE ~ 1.3 cm. RVTD ~ 9 cm/s.  The left atrial diameter is  mildly increased ~4.1 cm; DRE ~70.3ml/m².  The bio prosthetic mitral valve is a porcine valve. Regurgitation of the prosthetic valve is noted. The area by pressure half time is 1.5 cm². The mean trans mitral gradient is 4.0 mmHg. Mild to moderate (1-2+) regurgitation is noted.  Mild to moderate aortic valve stenosis is present. The aortic valve area planimetry is 0.6 cm². Aortic valve area continuity equation is 0.9 cm². PSV ~2.5 m/s. MG ~13.2 mmHg. DI ~0.30. Moderate calcification of the aortic valve is noted.  Mild to moderate (1-2+) aortic and pulmonic, mild (1+) tricuspid regurgitation.   The pulmonary artery systolic pressure is 13 mmHg.     ECHO 9.20.20  The left ventricle is normal in size. Global left ventricular systolic function is normal. The left ventricular ejection fraction is 60%. Left ventricular diastolic function is indeterminate. Noted left ventricular hypertrophy. Concentric left ventricular hypertrophy is present. It is mild to moderate.  The left atrial diameter is mildly increased.  The right atrium is mildly enlarged.   Aortic cusp separation measures 1.3 cms . Noted evidence of aortic stenosis. Mild aortic valve stenosis is present. Aortic valve area continuity equation is 1.5 cm². Noted evidence of aortic regurgitation. Mild to moderate (1-2+) aortic regurgitation. The trans-aortic peak velocity is 2.4 m/s. The trans-aortic mean gradient is 9.7 mmHg.  A bio prosthetic valve is noted at the mitral location. The bio prosthetic valve is a porcine valve. Regurgitation of the prosthetic valve is noted. MV MPG 6.3 mmHg. Mitral regurgitation is noted. Mild to moderate (1-2+) mitral regurgitation.  Evidence of pulmonic regurgitation is noted. Mild (1+) pulmonic regurgitation.  Evidence of tricuspid regurgitation is present. Mild (1+) tricuspid regurgitation.  The pulmonary artery systolic pressure is 37 mmHg. The pulmonary artery appears to be normal.    MPI 05.22.18:   No reversible ischemia  noted. EF 43%     C 4.18.12   Normal coronary arteries      Review of Systems   Constitutional: Negative.   Cardiovascular:  Negative for chest pain and leg swelling.   Respiratory:  Positive for shortness of breath and snoring.    Skin: Negative.    Musculoskeletal: Negative.    Gastrointestinal: Negative.    Genitourinary: Negative.    Neurological: Negative.      Objective:     Vital Signs (Most Recent):  Temp: 98.6 °F (37 °C) (07/10/23 0759)  Pulse: 62 (07/10/23 0759)  Resp: 18 (07/10/23 0759)  BP: (!) 157/83 (07/10/23 0759)  SpO2: (!) 94 % (07/10/23 0759) Vital Signs (24h Range):  Temp:  [97.4 °F (36.3 °C)-100.2 °F (37.9 °C)] 98.6 °F (37 °C)  Pulse:  [57-65] 62  Resp:  [17-21] 18  SpO2:  [94 %-96 %] 94 %  BP: (147-185)/(71-94) 157/83     Weight: 72.4 kg (159 lb 9.8 oz)  Body mass index is 30.16 kg/m².    SpO2: (!) 94 %         Intake/Output Summary (Last 24 hours) at 7/10/2023 0939  Last data filed at 7/10/2023 0557  Gross per 24 hour   Intake --   Output 100 ml   Net -100 ml         Lines/Drains/Airways       Peripheral Intravenous Line  Duration                  Peripheral IV - Single Lumen 07/08/23 0420 20 G Left Forearm 2 days                    Physical Exam  HENT:      Mouth/Throat:      Mouth: Mucous membranes are moist.   Cardiovascular:      Rate and Rhythm: Normal rate. Rhythm irregular.      Heart sounds: Murmur heard.   Pulmonary:      Effort: Pulmonary effort is normal.      Breath sounds: Normal breath sounds.   Abdominal:      Palpations: Abdomen is soft.   Musculoskeletal:         General: Normal range of motion.      Right lower leg: No edema.      Left lower leg: No edema.   Skin:     General: Skin is warm.   Neurological:      General: No focal deficit present.      Mental Status: She is alert.           Significant Labs: CMP   Recent Labs   Lab 07/09/23  0947 07/10/23  0357    140   K 3.9 4.2   CO2 27 27   BUN 25.9* 27.1*   CREATININE 1.28* 1.28*   CALCIUM 9.9 10.0   ALBUMIN 3.6 3.9    BILITOT 0.5 0.4   ALKPHOS 50 54   AST 13 14   ALT 8 10     , CBC   Recent Labs   Lab 07/10/23  0357   WBC 7.27   HGB 11.9*   HCT 37.2          Assessment and Plan:   Acute combined systolic and diastolic HF  - LVEF -45% with Grade II LV DD per ECHO 5.30.23 decreased from previous EF-60% per ECHO 9.20.20  PAF  --CHADS VASC score   Bio MVR (2012)  HTN  HLD  T2DM      Plan   Appears euvolemic. DC IV lasix. Start lasix 20 mg daily. Optimize HF GDMT- Dc losartan. Start Entresto 49-51 mg bid.   Continue sotalol 80 mg BID  Continue  Eliquis for primary prevention of CVA  Strict Intake and Output/Daily Weights  Labs in AM: CMP, CBC and Mag   Possible discharge in am  Will plan for 1 week MCT monitor upon discharge to assess Afib burden      VTE Risk Mitigation (From admission, onward)           Ordered     apixaban tablet 5 mg  2 times daily         07/08/23 1831     IP VTE HIGH RISK PATIENT  Once         07/08/23 0506     Place sequential compression device  Until discontinued         07/08/23 0506                    MARYJANE Wills  Cardiology  [facility] -   07/10/2023 9:20 AM

## 2023-07-11 VITALS
TEMPERATURE: 98 F | DIASTOLIC BLOOD PRESSURE: 77 MMHG | BODY MASS INDEX: 30.14 KG/M2 | HEIGHT: 61 IN | WEIGHT: 159.63 LBS | HEART RATE: 57 BPM | RESPIRATION RATE: 18 BRPM | OXYGEN SATURATION: 95 % | SYSTOLIC BLOOD PRESSURE: 162 MMHG

## 2023-07-11 PROBLEM — I50.21 ACUTE SYSTOLIC CHF (CONGESTIVE HEART FAILURE): Status: ACTIVE | Noted: 2023-07-11

## 2023-07-11 LAB
ANION GAP SERPL CALC-SCNC: 11 MEQ/L
BASOPHILS # BLD AUTO: 0.05 X10(3)/MCL
BASOPHILS NFR BLD AUTO: 0.7 %
BUN SERPL-MCNC: 26.6 MG/DL (ref 9.8–20.1)
CALCIUM SERPL-MCNC: 9.6 MG/DL (ref 8.4–10.2)
CHLORIDE SERPL-SCNC: 101 MMOL/L (ref 98–107)
CO2 SERPL-SCNC: 26 MMOL/L (ref 23–31)
CREAT SERPL-MCNC: 1.13 MG/DL (ref 0.55–1.02)
CREAT/UREA NIT SERPL: 24
EOSINOPHIL # BLD AUTO: 0.12 X10(3)/MCL (ref 0–0.9)
EOSINOPHIL NFR BLD AUTO: 1.7 %
ERYTHROCYTE [DISTWIDTH] IN BLOOD BY AUTOMATED COUNT: 13.2 % (ref 11.5–17)
GFR SERPLBLD CREATININE-BSD FMLA CKD-EPI: 49 MLS/MIN/1.73/M2
GLUCOSE SERPL-MCNC: 135 MG/DL (ref 82–115)
HCT VFR BLD AUTO: 38.3 % (ref 37–47)
HGB BLD-MCNC: 12.4 G/DL (ref 12–16)
IMM GRANULOCYTES # BLD AUTO: 0.02 X10(3)/MCL (ref 0–0.04)
IMM GRANULOCYTES NFR BLD AUTO: 0.3 %
LYMPHOCYTES # BLD AUTO: 1.54 X10(3)/MCL (ref 0.6–4.6)
LYMPHOCYTES NFR BLD AUTO: 21.8 %
MCH RBC QN AUTO: 27.4 PG (ref 27–31)
MCHC RBC AUTO-ENTMCNC: 32.4 G/DL (ref 33–36)
MCV RBC AUTO: 84.7 FL (ref 80–94)
MONOCYTES # BLD AUTO: 0.81 X10(3)/MCL (ref 0.1–1.3)
MONOCYTES NFR BLD AUTO: 11.5 %
NEUTROPHILS # BLD AUTO: 4.52 X10(3)/MCL (ref 2.1–9.2)
NEUTROPHILS NFR BLD AUTO: 64 %
NRBC BLD AUTO-RTO: 0 %
PLATELET # BLD AUTO: 255 X10(3)/MCL (ref 130–400)
PMV BLD AUTO: 11.7 FL (ref 7.4–10.4)
POTASSIUM SERPL-SCNC: 4.2 MMOL/L (ref 3.5–5.1)
RBC # BLD AUTO: 4.52 X10(6)/MCL (ref 4.2–5.4)
SODIUM SERPL-SCNC: 138 MMOL/L (ref 136–145)
WBC # SPEC AUTO: 7.06 X10(3)/MCL (ref 4.5–11.5)

## 2023-07-11 PROCEDURE — 25000003 PHARM REV CODE 250: Performed by: EMERGENCY MEDICINE

## 2023-07-11 PROCEDURE — 85025 COMPLETE CBC W/AUTO DIFF WBC: CPT | Performed by: NURSE PRACTITIONER

## 2023-07-11 PROCEDURE — G0378 HOSPITAL OBSERVATION PER HR: HCPCS

## 2023-07-11 PROCEDURE — 25000003 PHARM REV CODE 250: Performed by: NURSE PRACTITIONER

## 2023-07-11 PROCEDURE — 80048 BASIC METABOLIC PNL TOTAL CA: CPT | Performed by: NURSE PRACTITIONER

## 2023-07-11 PROCEDURE — 25000003 PHARM REV CODE 250

## 2023-07-11 RX ORDER — FUROSEMIDE 20 MG/1
20 TABLET ORAL DAILY
Qty: 30 TABLET | Refills: 11 | Status: SHIPPED | OUTPATIENT
Start: 2023-07-11 | End: 2024-07-10

## 2023-07-11 RX ADMIN — FUROSEMIDE 20 MG: 20 TABLET ORAL at 09:07

## 2023-07-11 RX ADMIN — POLYETHYLENE GLYCOL 3350 17 G: 17 POWDER, FOR SOLUTION ORAL at 09:07

## 2023-07-11 RX ADMIN — APIXABAN 5 MG: 5 TABLET, FILM COATED ORAL at 09:07

## 2023-07-11 RX ADMIN — FAMOTIDINE 20 MG: 20 TABLET, FILM COATED ORAL at 09:07

## 2023-07-11 RX ADMIN — SOTALOL HYDROCHLORIDE 80 MG: 80 TABLET ORAL at 09:07

## 2023-07-11 NOTE — DISCHARGE SUMMARY
[facility] - 4th Floor Medical Telemetry  Cardiology  Discharge Summary      Patient Name: Niharika Campbell  MRN: [id number]  Admission Date: 7/8/2023  Hospital Length of Stay: 0 days  Discharge Date and Time: No discharge date for patient encounter.  Attending Physician: Ritchie Dowd MD    Discharging Provider: MARYJANE Wills  Primary Care Physician: Primary Doctor No    HPI:   HPI: This is a 83 year old female known to [facility], Dr. Toro, with a PMH of PAF (on Eliquis), Bio MVR (2012), HTN, HLD, and DM.  She presented to [facility] ED with complaints of SOB for two consecutive nights.  Patient denied any exertional dyspnea with ambulation, peripheral edema, chest pain or palpitations. Chest x- ray - revealed cardiomegaly.  BP on admission 190/91.  BNP- 886.8, tropnin- negative,  Patient was noted to have a recent in office ECHO on 5.30.23 that revealed a mildly reduced systolic function with EF of 45% and grade II diastolic dysfunction. Mild to moderate regurgitation of prosthetic noted (see report below). The patient is admitted for CHF      7.9.23: Patient endorses improved SOB. Denies CP.   7.10.23: Patient sitting up in bedside chair. Report breathing is back to baseline. BP remains above goal.   7.11.23: Patient sitting at side of bed. Denies SOB. BP remains above goal      Physical Exam   HENT:   Mouth/Throat: Mucous membranes are moist.   Cardiovascular: Normal rate, regular rhythm, normal heart sounds and normal pulses. Pulmonary:      Breath sounds: Normal breath sounds.     Abdominal: Soft.   Neurological: She is alert.     Significant Diagnostic Studies: Labs: BMP:   Recent Labs   Lab 07/09/23  0947 07/10/23  0357 07/11/23  0644    140 138   K 3.9 4.2 4.2   CO2 27 27 26   BUN 25.9* 27.1* 26.6*   CREATININE 1.28* 1.28* 1.13*   CALCIUM 9.9 10.0 9.6   MG 1.60 1.80  --    , CBC   Recent Labs   Lab 07/10/23  0357 07/11/23  0644   WBC 7.27 7.06   HGB 11.9* 12.4   HCT 37.2 38.3   PLT  246 255   , and Troponin   Recent Labs   Lab 07/08/23  0353 07/08/23  1010 07/08/23  1529   TROPONINI <0.010 0.028 0.019         Acute combined systolic and diastolic HF  - LVEF -45% with Grade II LV DD per ECHO 5.30.23 decreased from previous EF-60% per ECHO 9.20.20  PAF  --CHADS VASC score   Bio MVR (2012)  HTN  HLD  T2DM        Plan   Appears euvolemic. Continue lasix 20 mg daily. Optimize HF GDMT- Continue lasix 20 mg daily. Increase Entresto to  mg  bid.   Continue sotalol 80 mg BID  Continue  Eliquis for primary prevention of CVA  Will plan for 1 week MCT monitor upon discharge to assess Afib burden  DC home       Discharged Condition: stable    Disposition: Home or Self Care    Follow Up:   Follow-up Information       Brannon Toro MD Follow up on 7/18/2023.    Specialty: Cardiology  Why: @ 2:00PM for MCT Monitor Placement [facility] Location  Contact information:  [address] [facility] [address]  [phone]               Brannon Toro MD Follow up on 7/25/2023.    Specialty: Cardiology  Why: @ 8:40AM [facility] Location  Contact information:  [address] [facility] [id number] [phone]                           Patient Instructions:      Diet Cardiac     Diet diabetic     Notify your health care provider if you experience any of the following:  difficulty breathing or increased cough     Notify your health care provider if you experience any of the following:   Order Comments: Obtain daily weights and to notify MD for 2# weight gain/day or > 5#/week, increasing SOB/peripheral edema/PND or orthopnea. She was also instructed on low salt cardiac diet and fluid restriction< 2L/day.     Activity as tolerated     Medications:  Reconciled Home Medications:      Medication List        START taking these medications      furosemide 20 MG tablet  Commonly known as: LASIX  Take 1 tablet (20 mg total) by mouth once daily.     sacubitriL-valsartan  mg per  tablet  Commonly known as: ENTRESTO  Take 1 tablet by mouth 2 (two) times daily.            CONTINUE taking these medications      atorvastatin 20 MG tablet  Commonly known as: LIPITOR  Take 20 mg by mouth nightly.     ELIQUIS 5 mg Tab  Generic drug: apixaban  Take 5 mg by mouth 2 (two) times daily.     glipiZIDE 2.5 MG Tr24  Commonly known as: GLUCOTROL  Take 5 mg by mouth before breakfast.     metFORMIN 500 MG tablet  Commonly known as: GLUCOPHAGE  Take 500 mg by mouth 2 (two) times daily with meals.     SOTALOL ORAL  Take 80 mg by mouth 2 (two) times a day.            STOP taking these medications      COZAAR 100 MG tablet  Generic drug: losartan              Time spent on the discharge of patient: 35 minutes    MARYJANE Wills  Cardiology  [facility] - 4th Floor Medical Telemetry

## 2024-04-11 ENCOUNTER — HOSPITAL ENCOUNTER (OUTPATIENT)
Dept: RADIOLOGY | Facility: HOSPITAL | Age: 84
Discharge: HOME | End: 2024-04-11
Attending: FAMILY MEDICINE
Payer: MEDICARE

## 2024-04-11 DIAGNOSIS — M81.0 OP (OSTEOPOROSIS): ICD-10-CM

## 2024-04-11 PROCEDURE — 77080 DXA BONE DENSITY AXIAL: CPT | Mod: TC
